# Patient Record
Sex: MALE | Race: WHITE | Employment: OTHER | URBAN - METROPOLITAN AREA
[De-identification: names, ages, dates, MRNs, and addresses within clinical notes are randomized per-mention and may not be internally consistent; named-entity substitution may affect disease eponyms.]

---

## 2024-07-21 ENCOUNTER — HOSPITAL ENCOUNTER (EMERGENCY)
Age: 89
Discharge: HOME | End: 2024-07-21
Attending: EMERGENCY MEDICINE
Payer: MEDICARE

## 2024-07-21 VITALS
TEMPERATURE: 97.9 F | WEIGHT: 170 LBS | BODY MASS INDEX: 24.34 KG/M2 | OXYGEN SATURATION: 96 % | HEIGHT: 70 IN | HEART RATE: 73 BPM | RESPIRATION RATE: 15 BRPM | SYSTOLIC BLOOD PRESSURE: 142 MMHG | DIASTOLIC BLOOD PRESSURE: 80 MMHG

## 2024-07-21 DIAGNOSIS — Z51.89 ENCOUNTER FOR WOUND RE-CHECK: Primary | ICD-10-CM

## 2024-07-21 PROCEDURE — 99283 EMERGENCY DEPT VISIT LOW MDM: CPT

## 2024-07-21 ASSESSMENT — PAIN - FUNCTIONAL ASSESSMENT: PAIN_FUNCTIONAL_ASSESSMENT: 0-10

## 2024-07-21 ASSESSMENT — LIFESTYLE VARIABLES
HOW OFTEN DO YOU HAVE A DRINK CONTAINING ALCOHOL: NEVER
HOW MANY STANDARD DRINKS CONTAINING ALCOHOL DO YOU HAVE ON A TYPICAL DAY: PATIENT DOES NOT DRINK

## 2024-07-21 ASSESSMENT — PAIN SCALES - GENERAL: PAINLEVEL_OUTOF10: 0

## 2024-07-21 NOTE — ED TRIAGE NOTES
Pt ambulatory to ED for wound check to back of right lower leg. States he has been seen by PCP 7/9 and given abx. States the wound does not seem to be improving.

## 2024-07-21 NOTE — ED PROVIDER NOTES
Emergency Department Provider Note       PCP: File, Not On   Age: 89 y.o.   Sex: male     DISPOSITION Decision To Discharge 07/21/2024 08:23:43 AM       ICD-10-CM    1. Encounter for wound re-check  Z51.89 [facility]          Medical Decision Making     There is no gross signs of infection noted.  Ulcerative wound noted to the calf muscle.    At this time I see no signs of significant coinfection.  Will have patient try to keep wound dry.  Follow-up with wound care center if symptoms persist     1 acute complicated illness or injury.  Shared medical decision making was utilized in creating the patients health plan today.    I independently ordered and reviewed each unique test.  I reviewed external records: ED visit note from an outside group.  I reviewed external records: provider visit note from PCP.  I reviewed external records: provider visit note from outside specialist.  I reviewed external records: previous lab results from outside ED.  I reviewed external records: previous imaging study including radiologist interpretation.                   History     Patient presents the ER with complaints of leg wound.  Patient states he had a wound on the back of his right calf muscle for a couple weeks.  In the interim has been seen by PCP and placed on antibiotics.  Denies any erythema.  Denies any fevers or chills.  Denies any significant pain.    The history is provided by the patient.   Wound Check   Previous treatment in the ED includes Oral antibiotics. There has been no drainage from the wound. There is no redness present. There is no swelling present. There is no pain present.     Physical Exam     Vitals signs and nursing note reviewed:  Vitals:    07/21/24 0814   BP: (!) 142/80   Pulse: 73   Resp: 15   Temp: 97.9 °F (36.6 °C)   TempSrc: Oral   SpO2: 96%   Weight: 77.1 kg (170 lb)   Height: 1.778 m (5' 10\")      Physical Exam  Vitals and nursing note reviewed.   Constitutional:

## 2024-07-21 NOTE — DISCHARGE INSTRUCTIONS
Remember to try to keep wound uncovered to help with healing  If not follow-up with wound care:  Return to the ER for any new, worsening symptoms    It has been my pleasure to care for you here in the ER  I hope we have addressed your concerns here today and offered you some answers  If you receive a survey concerning your care here, I hope you feel comfortable with giving your care team positive scores